# Patient Record
Sex: MALE | NOT HISPANIC OR LATINO | URBAN - METROPOLITAN AREA
[De-identification: names, ages, dates, MRNs, and addresses within clinical notes are randomized per-mention and may not be internally consistent; named-entity substitution may affect disease eponyms.]

---

## 2017-11-02 NOTE — PROCEDURE NOTE: CLINICAL
PROCEDURE NOTE: Biopsy of Eyelid Left Upper Lid. Diagnosis: Eyelid Lesion, Uncertain Behavior. Prior to treatment, the risks/benefits/alternatives were discussed. The patient wished to proceed with procedure. The area of the surgical site was prepped surgical scrub. 0.5 cc of 2% lidocaine with epinephrine was injected beneath the lesion. The lesion was excised with Laisha scissors. The defect was cauterized. Erythromycin ointment was placed on the biopsy site. Patient tolerated procedure well. There were no complications. The lesion was placed in formalin and sent to a pathology lab. Post procedure instructions given. Ana Schneider

## 2017-11-02 NOTE — PATIENT DISCUSSION
Recommended Dermatology follow by Dr. Alexa Johnson. Pt is not currently established with anyone. Emphasized importance of regular skin cancer screenings, based on patient's history.

## 2017-12-05 NOTE — PATIENT DISCUSSION
Recommended Dermatology follow by Dr. Otilio Irizarry. Pt is not currently established with anyone. Emphasized importance of regular skin cancer screenings, based on patient's history.

## 2018-12-04 NOTE — PATIENT DISCUSSION
Recommended Dermatology follow by Dr. Vivinae Walton. Pt is not currently established with anyone. Emphasized importance of regular skin cancer screenings, based on patient's history.

## 2021-04-20 ENCOUNTER — IMPORTED ENCOUNTER (OUTPATIENT)
Dept: URBAN - METROPOLITAN AREA CLINIC 31 | Facility: CLINIC | Age: 46
End: 2021-04-20

## 2021-04-20 PROBLEM — H17.89: Noted: 2021-04-20

## 2021-04-20 PROBLEM — Z98.890: Noted: 2021-04-20

## 2021-04-20 PROCEDURE — 92004 COMPRE OPH EXAM NEW PT 1/>: CPT

## 2021-04-20 PROCEDURE — 92015 DETERMINE REFRACTIVE STATE: CPT

## 2021-04-20 NOTE — PATIENT DISCUSSION
1.  S/P  Lasik OU: 2000---Slight regression--Pt wears the dist rx full time. 2. Near still good per pt3. Needs rx suns4.   RTN 1 yr CE  VSP

## 2022-04-02 ASSESSMENT — TONOMETRY
OD_IOP_MMHG: 14
OS_IOP_MMHG: 14

## 2022-04-02 ASSESSMENT — VISUAL ACUITY
OS_CC: 20/50-1
OD_CC: 20/50-1

## 2022-09-19 ENCOUNTER — ESTABLISHED PATIENT (OUTPATIENT)
Dept: URBAN - METROPOLITAN AREA CLINIC 34 | Facility: CLINIC | Age: 47
End: 2022-09-19

## 2022-09-19 DIAGNOSIS — Z01.00: ICD-10-CM

## 2022-09-19 PROCEDURE — 92015 DETERMINE REFRACTIVE STATE: CPT

## 2022-09-19 PROCEDURE — 92014 COMPRE OPH EXAM EST PT 1/>: CPT

## 2022-09-19 ASSESSMENT — VISUAL ACUITY
OD_CC: 20/20
OD_CC: 20/20-2
OS_CC: 20/20
OD_SC: 20/30-1
OS_CC: 20/20
OS_SC: 20/30-3

## 2022-09-19 ASSESSMENT — TONOMETRY
OD_IOP_MMHG: 12
OS_IOP_MMHG: 13

## 2023-11-17 ENCOUNTER — COMPREHENSIVE EXAM (OUTPATIENT)
Dept: URBAN - METROPOLITAN AREA CLINIC 34 | Facility: CLINIC | Age: 48
End: 2023-11-17

## 2023-11-17 DIAGNOSIS — Z01.00: ICD-10-CM

## 2023-11-17 PROCEDURE — 92014 COMPRE OPH EXAM EST PT 1/>: CPT

## 2023-11-17 PROCEDURE — 92015 DETERMINE REFRACTIVE STATE: CPT

## 2023-11-17 ASSESSMENT — VISUAL ACUITY
OD_CC: 20/20
OD_SC: 20/25-3
OS_SC: 20/25-2
OS_CC: 20/20
OS_CC: 20/30
OD_CC: 20/30-2

## 2023-11-17 ASSESSMENT — TONOMETRY
OD_IOP_MMHG: 13
OS_IOP_MMHG: 13